# Patient Record
Sex: FEMALE | Race: BLACK OR AFRICAN AMERICAN | NOT HISPANIC OR LATINO | URBAN - METROPOLITAN AREA
[De-identification: names, ages, dates, MRNs, and addresses within clinical notes are randomized per-mention and may not be internally consistent; named-entity substitution may affect disease eponyms.]

---

## 2018-07-19 ENCOUNTER — NEW PATIENT (OUTPATIENT)
Dept: URBAN - METROPOLITAN AREA CLINIC 56 | Facility: CLINIC | Age: 83
End: 2018-07-19
Payer: MEDICARE

## 2018-07-19 DIAGNOSIS — H17.13 CENTRAL CORNEAL OPACITY, BILATERAL: ICD-10-CM

## 2018-07-19 DIAGNOSIS — E11.9 TYPE 2 DIABETES MELLITUS WITHOUT COMPLICATIONS: Primary | ICD-10-CM

## 2018-07-19 DIAGNOSIS — H40.1132 PRIMARY OPEN-ANGLE GLAUCOMA, MODERATE STAGE, BILATERAL: ICD-10-CM

## 2018-07-19 PROCEDURE — 92004 COMPRE OPH EXAM NEW PT 1/>: CPT | Performed by: OPTOMETRIST

## 2018-07-19 PROCEDURE — 92250 FUNDUS PHOTOGRAPHY W/I&R: CPT | Performed by: OPTOMETRIST

## 2018-07-19 RX ORDER — DORZOLAMIDE HCL 20 MG/ML
2 % SOLUTION/ DROPS OPHTHALMIC
Qty: 1 | Refills: 3 | Status: INACTIVE
Start: 2018-07-19 | End: 2018-10-22

## 2018-07-19 RX ORDER — BRIMONIDINE TARTRATE 1 MG/ML
0.1 % SOLUTION/ DROPS OPHTHALMIC
Qty: 1 | Refills: 3 | Status: INACTIVE
Start: 2018-07-19 | End: 2018-10-22

## 2018-07-19 RX ORDER — LATANOPROST 50 UG/ML
0.005 % SOLUTION OPHTHALMIC
Qty: 0 | Refills: 0 | Status: INACTIVE
Start: 2018-07-19 | End: 2018-07-20

## 2018-07-19 ASSESSMENT — VISUAL ACUITY
OD: 20/30
OS: 20/30

## 2018-07-19 ASSESSMENT — INTRAOCULAR PRESSURE
OD: 16
OS: 13

## 2018-07-19 ASSESSMENT — KERATOMETRY: OD: 39.16

## 2018-10-22 ENCOUNTER — CONSULT (OUTPATIENT)
Dept: URBAN - METROPOLITAN AREA CLINIC 56 | Facility: CLINIC | Age: 83
End: 2018-10-22
Payer: MEDICARE

## 2018-10-22 PROCEDURE — 92014 COMPRE OPH EXAM EST PT 1/>: CPT | Performed by: OPHTHALMOLOGY

## 2018-10-22 PROCEDURE — 76514 ECHO EXAM OF EYE THICKNESS: CPT | Performed by: OPHTHALMOLOGY

## 2018-10-22 PROCEDURE — 92020 GONIOSCOPY: CPT | Performed by: OPHTHALMOLOGY

## 2018-10-22 PROCEDURE — 92083 EXTENDED VISUAL FIELD XM: CPT | Performed by: OPHTHALMOLOGY

## 2018-10-22 RX ORDER — BRIMONIDINE TARTRATE 1 MG/ML
0.1 % SOLUTION/ DROPS OPHTHALMIC
Qty: 1 | Refills: 5 | Status: INACTIVE
Start: 2018-10-22 | End: 2018-11-09

## 2018-10-22 RX ORDER — LATANOPROST 50 UG/ML
0.005 % SOLUTION OPHTHALMIC
Qty: 1 | Refills: 5 | Status: INACTIVE
Start: 2018-10-22 | End: 2018-11-09

## 2018-10-22 RX ORDER — DORZOLAMIDE HCL 20 MG/ML
2 % SOLUTION/ DROPS OPHTHALMIC
Qty: 1 | Refills: 5 | Status: INACTIVE
Start: 2018-10-22 | End: 2018-10-31

## 2018-10-22 ASSESSMENT — INTRAOCULAR PRESSURE
OS: 28
OD: 17
OS: 20

## 2018-11-09 ENCOUNTER — FOLLOW UP ESTABLISHED (OUTPATIENT)
Dept: URBAN - METROPOLITAN AREA CLINIC 56 | Facility: CLINIC | Age: 83
End: 2018-11-09
Payer: MEDICARE

## 2018-11-09 PROCEDURE — 92012 INTRM OPH EXAM EST PATIENT: CPT | Performed by: OPTOMETRIST

## 2018-11-09 RX ORDER — BESIFLOXACIN 6 MG/ML
0.6 % SUSPENSION OPHTHALMIC
Qty: 1 | Refills: 0 | Status: INACTIVE
Start: 2018-11-09 | End: 2018-11-10

## 2018-11-10 ENCOUNTER — FOLLOW UP ESTABLISHED (OUTPATIENT)
Dept: URBAN - METROPOLITAN AREA CLINIC 10 | Facility: CLINIC | Age: 83
End: 2018-11-10
Payer: MEDICARE

## 2018-11-10 PROCEDURE — 92012 INTRM OPH EXAM EST PATIENT: CPT | Performed by: OPTOMETRIST

## 2018-11-10 RX ORDER — BESIFLOXACIN 6 MG/ML
0.6 % SUSPENSION OPHTHALMIC
Qty: 1 | Refills: 2 | Status: INACTIVE
Start: 2018-11-10 | End: 2018-11-12

## 2018-11-12 ENCOUNTER — CONSULT (OUTPATIENT)
Dept: URBAN - METROPOLITAN AREA CLINIC 10 | Facility: CLINIC | Age: 83
End: 2018-11-12
Payer: MEDICARE

## 2018-11-12 PROCEDURE — 92014 COMPRE OPH EXAM EST PT 1/>: CPT | Performed by: OPHTHALMOLOGY

## 2018-11-12 PROCEDURE — 92004 COMPRE OPH EXAM NEW PT 1/>: CPT | Performed by: OPHTHALMOLOGY

## 2018-11-12 RX ORDER — BESIFLOXACIN 6 MG/ML
0.6 % SUSPENSION OPHTHALMIC
Qty: 1 | Refills: 2 | Status: INACTIVE
Start: 2018-11-12 | End: 2018-11-19

## 2018-11-12 RX ORDER — PREDNISOLONE ACETATE 10 MG/ML
1 % SUSPENSION/ DROPS OPHTHALMIC
Qty: 1 | Refills: 1 | Status: INACTIVE
Start: 2018-11-12 | End: 2018-11-19

## 2018-11-12 RX ORDER — CYCLOPENTOLATE HYDROCHLORIDE 10 MG/ML
1 % SOLUTION/ DROPS OPHTHALMIC
Qty: 1 | Refills: 1 | Status: INACTIVE
Start: 2018-11-12 | End: 2018-11-19

## 2018-11-14 ENCOUNTER — FOLLOW UP ESTABLISHED (OUTPATIENT)
Dept: URBAN - METROPOLITAN AREA CLINIC 44 | Facility: CLINIC | Age: 83
End: 2018-11-14
Payer: MEDICARE

## 2018-11-14 DIAGNOSIS — H18.422 BAND KERATOPATHY, LEFT EYE: ICD-10-CM

## 2018-11-14 PROCEDURE — 92014 COMPRE OPH EXAM EST PT 1/>: CPT | Performed by: OPHTHALMOLOGY

## 2018-11-16 ENCOUNTER — FOLLOW UP ESTABLISHED (OUTPATIENT)
Dept: URBAN - METROPOLITAN AREA CLINIC 56 | Facility: CLINIC | Age: 83
End: 2018-11-16
Payer: MEDICARE

## 2018-11-16 PROCEDURE — 92012 INTRM OPH EXAM EST PATIENT: CPT | Performed by: OPTOMETRIST

## 2018-11-19 ENCOUNTER — FOLLOW UP ESTABLISHED (OUTPATIENT)
Dept: URBAN - METROPOLITAN AREA CLINIC 44 | Facility: CLINIC | Age: 83
End: 2018-11-19
Payer: MEDICARE

## 2018-11-19 PROCEDURE — 92012 INTRM OPH EXAM EST PATIENT: CPT | Performed by: OPHTHALMOLOGY

## 2018-11-19 RX ORDER — LATANOPROST 50 UG/ML
0.005 % SOLUTION OPHTHALMIC
Qty: 0 | Refills: 0 | Status: INACTIVE
Start: 2018-11-19 | End: 2019-03-22

## 2018-11-19 RX ORDER — PREDNISOLONE/GATIFLOX/NEPAFEN 1-0.5-0.1%
1 % SUSPENSION, DROPS(FINAL DOSAGE FORM)(ML) OPHTHALMIC (EYE)
Qty: 0 | Refills: 0 | Status: INACTIVE
Start: 2018-11-19 | End: 2018-11-21

## 2018-11-19 RX ORDER — CYCLOPENTOLATE HYDROCHLORIDE 10 MG/ML
1 % SOLUTION/ DROPS OPHTHALMIC
Qty: 0 | Refills: 0 | Status: INACTIVE
Start: 2018-11-19 | End: 2018-11-21

## 2018-11-19 RX ORDER — DORZOLAMIDE/TIMOLOL/PF 2 %-0.5 %
DROPS OPHTHALMIC (EYE)
Qty: 0 | Refills: 0 | Status: INACTIVE
Start: 2018-11-19 | End: 2019-02-21

## 2018-11-21 ENCOUNTER — FOLLOW UP ESTABLISHED (OUTPATIENT)
Dept: URBAN - METROPOLITAN AREA CLINIC 56 | Facility: CLINIC | Age: 83
End: 2018-11-21
Payer: MEDICARE

## 2018-11-21 DIAGNOSIS — H16.032 CORNEAL ULCER WITH HYPOPYON, LEFT EYE: Primary | ICD-10-CM

## 2018-11-21 PROCEDURE — 92012 INTRM OPH EXAM EST PATIENT: CPT | Performed by: OPTOMETRIST

## 2018-11-26 ENCOUNTER — FOLLOW UP ESTABLISHED (OUTPATIENT)
Dept: URBAN - METROPOLITAN AREA CLINIC 56 | Facility: CLINIC | Age: 83
End: 2018-11-26
Payer: MEDICARE

## 2018-11-26 PROCEDURE — 92012 INTRM OPH EXAM EST PATIENT: CPT | Performed by: OPTOMETRIST

## 2018-11-28 ENCOUNTER — FOLLOW UP ESTABLISHED (OUTPATIENT)
Dept: URBAN - METROPOLITAN AREA CLINIC 44 | Facility: CLINIC | Age: 83
End: 2018-11-28
Payer: MEDICARE

## 2018-11-28 PROCEDURE — 92012 INTRM OPH EXAM EST PATIENT: CPT | Performed by: OPHTHALMOLOGY

## 2018-11-28 PROCEDURE — 65430 CORNEAL SMEAR: CPT | Performed by: OPHTHALMOLOGY

## 2018-11-30 ENCOUNTER — FOLLOW UP ESTABLISHED (OUTPATIENT)
Dept: URBAN - METROPOLITAN AREA CLINIC 44 | Facility: CLINIC | Age: 83
End: 2018-11-30
Payer: MEDICARE

## 2018-11-30 PROCEDURE — 92012 INTRM OPH EXAM EST PATIENT: CPT | Performed by: OPTOMETRIST

## 2018-12-03 ENCOUNTER — FOLLOW UP ESTABLISHED (OUTPATIENT)
Dept: URBAN - METROPOLITAN AREA CLINIC 44 | Facility: CLINIC | Age: 83
End: 2018-12-03
Payer: MEDICARE

## 2018-12-03 PROCEDURE — 92012 INTRM OPH EXAM EST PATIENT: CPT | Performed by: OPHTHALMOLOGY

## 2018-12-03 RX ORDER — NEOMYCIN SULFATE, POLYMYXIN B SULFATE AND DEXAMETHASONE 3.5; 10000; 1 MG/G; [USP'U]/G; MG/G
OINTMENT OPHTHALMIC
Qty: 1 | Refills: 1 | Status: INACTIVE
Start: 2018-12-03 | End: 2018-12-26

## 2018-12-03 RX ORDER — PREDNISOLONE/GATIFLOX/NEPAFEN 1-0.5-0.1%
1 % SUSPENSION, DROPS(FINAL DOSAGE FORM)(ML) OPHTHALMIC (EYE)
Qty: 1 | Refills: 2 | Status: INACTIVE
Start: 2018-12-03 | End: 2018-12-26

## 2018-12-07 ENCOUNTER — FOLLOW UP ESTABLISHED (OUTPATIENT)
Dept: URBAN - METROPOLITAN AREA CLINIC 56 | Facility: CLINIC | Age: 83
End: 2018-12-07
Payer: MEDICARE

## 2018-12-07 PROCEDURE — 92012 INTRM OPH EXAM EST PATIENT: CPT | Performed by: OPTOMETRIST

## 2018-12-12 ENCOUNTER — FOLLOW UP ESTABLISHED (OUTPATIENT)
Dept: URBAN - METROPOLITAN AREA CLINIC 44 | Facility: CLINIC | Age: 83
End: 2018-12-12
Payer: MEDICARE

## 2018-12-12 PROCEDURE — 92012 INTRM OPH EXAM EST PATIENT: CPT | Performed by: OPHTHALMOLOGY

## 2018-12-17 ENCOUNTER — FOLLOW UP ESTABLISHED (OUTPATIENT)
Dept: URBAN - METROPOLITAN AREA CLINIC 44 | Facility: CLINIC | Age: 83
End: 2018-12-17
Payer: MEDICARE

## 2018-12-17 DIAGNOSIS — H18.423 BAND KERATOPATHY, BILATERAL: ICD-10-CM

## 2018-12-17 PROCEDURE — 92071 CONTACT LENS FITTING FOR TX: CPT | Performed by: OPHTHALMOLOGY

## 2018-12-17 PROCEDURE — 92012 INTRM OPH EXAM EST PATIENT: CPT | Performed by: OPHTHALMOLOGY

## 2018-12-17 ASSESSMENT — INTRAOCULAR PRESSURE
OD: 14
OS: 18

## 2018-12-26 ENCOUNTER — FOLLOW UP ESTABLISHED (OUTPATIENT)
Dept: URBAN - METROPOLITAN AREA CLINIC 44 | Facility: CLINIC | Age: 83
End: 2018-12-26
Payer: MEDICARE

## 2018-12-26 PROCEDURE — 92012 INTRM OPH EXAM EST PATIENT: CPT | Performed by: OPHTHALMOLOGY

## 2018-12-26 RX ORDER — ERYTHROMYCIN 5 MG/G
OINTMENT OPHTHALMIC
Qty: 1 | Refills: 1 | Status: INACTIVE
Start: 2018-12-26 | End: 2019-01-08

## 2018-12-26 ASSESSMENT — INTRAOCULAR PRESSURE
OD: 12
OS: 12

## 2018-12-31 ENCOUNTER — FOLLOW UP ESTABLISHED (OUTPATIENT)
Dept: URBAN - METROPOLITAN AREA CLINIC 56 | Facility: CLINIC | Age: 83
End: 2018-12-31
Payer: MEDICARE

## 2018-12-31 PROCEDURE — 92012 INTRM OPH EXAM EST PATIENT: CPT | Performed by: OPTOMETRIST

## 2018-12-31 RX ORDER — PREDNISOLONE ACETATE 10 MG/ML
1 % SUSPENSION/ DROPS OPHTHALMIC
Qty: 1 | Refills: 1 | Status: INACTIVE
Start: 2018-12-31 | End: 2019-01-23

## 2018-12-31 ASSESSMENT — INTRAOCULAR PRESSURE
OD: 14
OS: 7

## 2019-01-04 ENCOUNTER — FOLLOW UP ESTABLISHED (OUTPATIENT)
Dept: URBAN - METROPOLITAN AREA CLINIC 56 | Facility: CLINIC | Age: 84
End: 2019-01-04
Payer: MEDICARE

## 2019-01-04 PROCEDURE — 92012 INTRM OPH EXAM EST PATIENT: CPT | Performed by: OPTOMETRIST

## 2019-01-14 ENCOUNTER — FOLLOW UP ESTABLISHED (OUTPATIENT)
Dept: URBAN - METROPOLITAN AREA CLINIC 44 | Facility: CLINIC | Age: 84
End: 2019-01-14
Payer: MEDICARE

## 2019-01-14 PROCEDURE — 92012 INTRM OPH EXAM EST PATIENT: CPT | Performed by: OPHTHALMOLOGY

## 2019-01-14 ASSESSMENT — INTRAOCULAR PRESSURE
OS: 25
OD: 13
OD: 12
OS: 23

## 2019-01-21 ENCOUNTER — FOLLOW UP ESTABLISHED (OUTPATIENT)
Dept: URBAN - METROPOLITAN AREA CLINIC 44 | Facility: CLINIC | Age: 84
End: 2019-01-21
Payer: MEDICARE

## 2019-01-21 DIAGNOSIS — Z79.84 LONG TERM (CURRENT) USE OF ORAL ANTIDIABETIC DRUGS: ICD-10-CM

## 2019-01-21 DIAGNOSIS — H02.839 DERMATOCHALASIS OF UNSPECIFIED EYE, UNSPECIFIED EYELID: ICD-10-CM

## 2019-01-21 PROCEDURE — 92012 INTRM OPH EXAM EST PATIENT: CPT | Performed by: OPHTHALMOLOGY

## 2019-01-21 ASSESSMENT — INTRAOCULAR PRESSURE
OD: 18
OS: 20

## 2019-01-23 ENCOUNTER — FOLLOW UP ESTABLISHED (OUTPATIENT)
Dept: URBAN - METROPOLITAN AREA CLINIC 44 | Facility: CLINIC | Age: 84
End: 2019-01-23
Payer: MEDICARE

## 2019-01-23 PROCEDURE — 92012 INTRM OPH EXAM EST PATIENT: CPT | Performed by: OPHTHALMOLOGY

## 2019-01-23 RX ORDER — OFLOXACIN 3 MG/ML
0.3 % SOLUTION/ DROPS OPHTHALMIC
Qty: 1 | Refills: 1 | Status: INACTIVE
Start: 2019-01-23 | End: 2019-04-10

## 2019-01-23 RX ORDER — PREDNISOLONE ACETATE 10 MG/ML
1 % SUSPENSION/ DROPS OPHTHALMIC
Qty: 1 | Refills: 1 | Status: INACTIVE
Start: 2019-01-23 | End: 2019-05-01

## 2019-01-23 ASSESSMENT — INTRAOCULAR PRESSURE
OD: 12
OS: 26

## 2019-01-28 ENCOUNTER — FOLLOW UP ESTABLISHED (OUTPATIENT)
Dept: URBAN - METROPOLITAN AREA CLINIC 44 | Facility: CLINIC | Age: 84
End: 2019-01-28
Payer: MEDICARE

## 2019-01-28 PROCEDURE — 92012 INTRM OPH EXAM EST PATIENT: CPT | Performed by: OPHTHALMOLOGY

## 2019-01-28 ASSESSMENT — INTRAOCULAR PRESSURE
OD: 15
OS: 53

## 2019-02-11 ENCOUNTER — FOLLOW UP ESTABLISHED (OUTPATIENT)
Dept: URBAN - METROPOLITAN AREA CLINIC 44 | Facility: CLINIC | Age: 84
End: 2019-02-11
Payer: MEDICARE

## 2019-02-11 PROCEDURE — 92012 INTRM OPH EXAM EST PATIENT: CPT | Performed by: OPHTHALMOLOGY

## 2019-02-11 ASSESSMENT — INTRAOCULAR PRESSURE
OD: 18
OS: 26

## 2019-05-01 ENCOUNTER — FOLLOW UP ESTABLISHED (OUTPATIENT)
Dept: URBAN - METROPOLITAN AREA CLINIC 44 | Facility: CLINIC | Age: 84
End: 2019-05-01
Payer: MEDICARE

## 2019-05-01 PROCEDURE — 65436 CURETTE/TREAT CORNEA: CPT | Performed by: OPHTHALMOLOGY

## 2019-05-01 PROCEDURE — 92012 INTRM OPH EXAM EST PATIENT: CPT | Performed by: OPHTHALMOLOGY

## 2019-05-01 RX ORDER — OFLOXACIN 3 MG/ML
0.3 % SOLUTION/ DROPS OPHTHALMIC
Qty: 1 | Refills: 1 | Status: INACTIVE
Start: 2019-05-01 | End: 2019-07-09

## 2019-05-01 RX ORDER — PREDNISOLONE ACETATE 10 MG/ML
1 % SUSPENSION/ DROPS OPHTHALMIC
Qty: 1 | Refills: 1 | Status: INACTIVE
Start: 2019-05-01 | End: 2019-07-09

## 2019-05-08 ENCOUNTER — FOLLOW UP ESTABLISHED (OUTPATIENT)
Dept: URBAN - METROPOLITAN AREA CLINIC 56 | Facility: CLINIC | Age: 84
End: 2019-05-08
Payer: MEDICARE

## 2019-05-08 PROCEDURE — 92012 INTRM OPH EXAM EST PATIENT: CPT | Performed by: OPTOMETRIST

## 2019-05-08 ASSESSMENT — INTRAOCULAR PRESSURE: OS: 23

## 2019-05-20 ENCOUNTER — POST OP (OUTPATIENT)
Dept: URBAN - METROPOLITAN AREA CLINIC 44 | Facility: CLINIC | Age: 84
End: 2019-05-20
Payer: MEDICARE

## 2019-05-20 PROCEDURE — 92012 INTRM OPH EXAM EST PATIENT: CPT | Performed by: OPHTHALMOLOGY

## 2019-05-31 ENCOUNTER — FOLLOW UP ESTABLISHED (OUTPATIENT)
Dept: URBAN - METROPOLITAN AREA CLINIC 44 | Facility: CLINIC | Age: 84
End: 2019-05-31
Payer: MEDICARE

## 2019-05-31 PROCEDURE — 92012 INTRM OPH EXAM EST PATIENT: CPT | Performed by: OPHTHALMOLOGY

## 2019-05-31 ASSESSMENT — INTRAOCULAR PRESSURE
OS: 18
OD: 14

## 2019-07-09 ENCOUNTER — FOLLOW UP ESTABLISHED (OUTPATIENT)
Dept: URBAN - METROPOLITAN AREA CLINIC 44 | Facility: CLINIC | Age: 84
End: 2019-07-09
Payer: MEDICARE

## 2019-07-09 PROCEDURE — 92012 INTRM OPH EXAM EST PATIENT: CPT | Performed by: OPHTHALMOLOGY

## 2019-07-09 RX ORDER — PREDNISOLONE ACETATE 10 MG/ML
1 % SUSPENSION/ DROPS OPHTHALMIC
Qty: 1 | Refills: 1 | Status: INACTIVE
Start: 2019-07-09 | End: 2019-08-21

## 2019-07-09 RX ORDER — OFLOXACIN 3 MG/ML
0.3 % SOLUTION/ DROPS OPHTHALMIC
Qty: 1 | Refills: 1 | Status: INACTIVE
Start: 2019-07-09 | End: 2020-01-22

## 2019-07-09 ASSESSMENT — INTRAOCULAR PRESSURE
OS: 20
OD: 17

## 2019-08-21 ENCOUNTER — FOLLOW UP ESTABLISHED (OUTPATIENT)
Dept: URBAN - METROPOLITAN AREA CLINIC 44 | Facility: CLINIC | Age: 84
End: 2019-08-21
Payer: MEDICARE

## 2019-08-21 PROCEDURE — 92012 INTRM OPH EXAM EST PATIENT: CPT | Performed by: OPHTHALMOLOGY

## 2019-08-21 PROCEDURE — 65436 CURETTE/TREAT CORNEA: CPT | Performed by: OPHTHALMOLOGY

## 2019-08-21 RX ORDER — PREDNISOLONE ACETATE 10 MG/ML
1 % SUSPENSION/ DROPS OPHTHALMIC
Qty: 1 | Refills: 1 | Status: INACTIVE
Start: 2019-08-21 | End: 2020-01-22

## 2019-08-30 ENCOUNTER — FOLLOW UP ESTABLISHED (OUTPATIENT)
Dept: URBAN - METROPOLITAN AREA CLINIC 56 | Facility: CLINIC | Age: 84
End: 2019-08-30
Payer: MEDICARE

## 2019-08-30 PROCEDURE — 92012 INTRM OPH EXAM EST PATIENT: CPT | Performed by: OPTOMETRIST

## 2019-08-30 ASSESSMENT — INTRAOCULAR PRESSURE
OS: 22
OD: 22

## 2019-09-04 ENCOUNTER — FOLLOW UP ESTABLISHED (OUTPATIENT)
Dept: URBAN - METROPOLITAN AREA CLINIC 44 | Facility: CLINIC | Age: 84
End: 2019-09-04
Payer: MEDICARE

## 2019-09-04 PROCEDURE — 99024 POSTOP FOLLOW-UP VISIT: CPT | Performed by: OPHTHALMOLOGY

## 2019-09-04 PROCEDURE — 92012 INTRM OPH EXAM EST PATIENT: CPT | Performed by: OPHTHALMOLOGY

## 2019-09-04 ASSESSMENT — VISUAL ACUITY
OS: 20/60
OD: 20/40

## 2019-09-04 ASSESSMENT — INTRAOCULAR PRESSURE
OD: 17
OS: 15

## 2019-11-18 ENCOUNTER — FOLLOW UP ESTABLISHED (OUTPATIENT)
Dept: URBAN - METROPOLITAN AREA CLINIC 44 | Facility: CLINIC | Age: 84
End: 2019-11-18
Payer: MEDICARE

## 2019-11-18 PROCEDURE — 92012 INTRM OPH EXAM EST PATIENT: CPT | Performed by: OPHTHALMOLOGY

## 2019-11-18 PROCEDURE — 92083 EXTENDED VISUAL FIELD XM: CPT | Performed by: OPHTHALMOLOGY

## 2019-11-18 RX ORDER — NETARSUDIL 0.2 MG/ML
0.02 % SOLUTION/ DROPS OPHTHALMIC; TOPICAL
Qty: 3 | Refills: 1 | Status: INACTIVE
Start: 2019-11-18 | End: 2020-08-21

## 2019-11-18 ASSESSMENT — INTRAOCULAR PRESSURE
OS: 16
OD: 14

## 2019-12-11 ENCOUNTER — FOLLOW UP ESTABLISHED (OUTPATIENT)
Dept: URBAN - METROPOLITAN AREA CLINIC 44 | Facility: CLINIC | Age: 84
End: 2019-12-11
Payer: MEDICARE

## 2019-12-11 PROCEDURE — 92012 INTRM OPH EXAM EST PATIENT: CPT | Performed by: OPHTHALMOLOGY

## 2019-12-11 PROCEDURE — 92025 CPTRIZED CORNEAL TOPOGRAPHY: CPT | Performed by: OPHTHALMOLOGY

## 2019-12-11 ASSESSMENT — INTRAOCULAR PRESSURE
OS: 16
OD: 16

## 2019-12-11 ASSESSMENT — VISUAL ACUITY
OS: 20/150
OD: 20/20

## 2020-01-22 ENCOUNTER — FOLLOW UP ESTABLISHED (OUTPATIENT)
Dept: URBAN - METROPOLITAN AREA CLINIC 44 | Facility: CLINIC | Age: 85
End: 2020-01-22
Payer: MEDICARE

## 2020-01-22 PROCEDURE — 92012 INTRM OPH EXAM EST PATIENT: CPT | Performed by: OPHTHALMOLOGY

## 2020-01-22 RX ORDER — PREDNISOLONE ACETATE 10 MG/ML
1 % SUSPENSION/ DROPS OPHTHALMIC
Qty: 10 | Refills: 3 | Status: INACTIVE
Start: 2020-01-22 | End: 2021-01-04

## 2020-01-22 ASSESSMENT — INTRAOCULAR PRESSURE
OD: 23
OS: 23

## 2020-02-10 ENCOUNTER — FOLLOW UP ESTABLISHED (OUTPATIENT)
Dept: URBAN - METROPOLITAN AREA CLINIC 44 | Facility: CLINIC | Age: 85
End: 2020-02-10
Payer: MEDICARE

## 2020-02-10 PROCEDURE — 92012 INTRM OPH EXAM EST PATIENT: CPT | Performed by: OPHTHALMOLOGY

## 2020-02-10 ASSESSMENT — INTRAOCULAR PRESSURE
OS: 16
OD: 14

## 2020-06-01 ENCOUNTER — FOLLOW UP ESTABLISHED (OUTPATIENT)
Dept: URBAN - METROPOLITAN AREA CLINIC 44 | Facility: CLINIC | Age: 85
End: 2020-06-01
Payer: MEDICARE

## 2020-06-01 PROCEDURE — 92012 INTRM OPH EXAM EST PATIENT: CPT | Performed by: OPHTHALMOLOGY

## 2020-06-01 ASSESSMENT — INTRAOCULAR PRESSURE
OD: 21
OS: 22

## 2020-06-29 ENCOUNTER — FOLLOW UP ESTABLISHED (OUTPATIENT)
Dept: URBAN - METROPOLITAN AREA CLINIC 44 | Facility: CLINIC | Age: 85
End: 2020-06-29
Payer: MEDICARE

## 2020-06-29 PROCEDURE — 92012 INTRM OPH EXAM EST PATIENT: CPT | Performed by: OPHTHALMOLOGY

## 2020-06-29 RX ORDER — DORZOLAMIDE HCL 20 MG/ML
2 % SOLUTION/ DROPS OPHTHALMIC
Qty: 10 | Refills: 5 | Status: INACTIVE
Start: 2020-06-29 | End: 2021-04-05

## 2020-06-29 RX ORDER — BRIMONIDINE TARTRATE 1 MG/ML
0.1 % SOLUTION/ DROPS OPHTHALMIC
Qty: 15 | Refills: 2 | Status: INACTIVE
Start: 2020-06-29 | End: 2021-02-01

## 2020-06-29 ASSESSMENT — INTRAOCULAR PRESSURE
OD: 13
OS: 15

## 2020-09-02 ENCOUNTER — FOLLOW UP ESTABLISHED (OUTPATIENT)
Dept: URBAN - METROPOLITAN AREA CLINIC 44 | Facility: CLINIC | Age: 85
End: 2020-09-02
Payer: MEDICARE

## 2020-09-02 PROCEDURE — 65436 CURETTE/TREAT CORNEA: CPT | Performed by: OPHTHALMOLOGY

## 2020-09-02 PROCEDURE — 92012 INTRM OPH EXAM EST PATIENT: CPT | Performed by: OPHTHALMOLOGY

## 2020-09-02 RX ORDER — ACETAMINOPHEN AND CODEINE PHOSPHATE 300; 30 MG/1; MG/1
TABLET ORAL
Qty: 10 | Refills: 0 | Status: ACTIVE
Start: 2020-09-02

## 2020-09-09 ENCOUNTER — FOLLOW UP ESTABLISHED (OUTPATIENT)
Dept: URBAN - METROPOLITAN AREA CLINIC 44 | Facility: CLINIC | Age: 85
End: 2020-09-09
Payer: MEDICARE

## 2020-09-09 PROCEDURE — 92012 INTRM OPH EXAM EST PATIENT: CPT | Performed by: OPTOMETRIST

## 2020-09-30 ENCOUNTER — FOLLOW UP ESTABLISHED (OUTPATIENT)
Dept: URBAN - METROPOLITAN AREA CLINIC 44 | Facility: CLINIC | Age: 85
End: 2020-09-30
Payer: MEDICARE

## 2020-09-30 PROCEDURE — 92025 CPTRIZED CORNEAL TOPOGRAPHY: CPT | Performed by: OPHTHALMOLOGY

## 2020-09-30 PROCEDURE — 99024 POSTOP FOLLOW-UP VISIT: CPT | Performed by: OPHTHALMOLOGY

## 2020-09-30 PROCEDURE — 92012 INTRM OPH EXAM EST PATIENT: CPT | Performed by: OPHTHALMOLOGY

## 2020-09-30 ASSESSMENT — INTRAOCULAR PRESSURE
OD: 16
OS: 16

## 2020-09-30 ASSESSMENT — VISUAL ACUITY: OS: 20/200

## 2020-11-30 ENCOUNTER — FOLLOW UP ESTABLISHED (OUTPATIENT)
Dept: URBAN - METROPOLITAN AREA CLINIC 44 | Facility: CLINIC | Age: 85
End: 2020-11-30
Payer: MEDICARE

## 2020-11-30 DIAGNOSIS — H40.1133 PRIMARY OPEN-ANGLE GLAUCOMA, BILATERAL, SEVERE STAGE: ICD-10-CM

## 2020-11-30 PROCEDURE — 92012 INTRM OPH EXAM EST PATIENT: CPT | Performed by: OPHTHALMOLOGY

## 2020-11-30 ASSESSMENT — INTRAOCULAR PRESSURE
OD: 17
OS: 18

## 2020-12-14 ENCOUNTER — FOLLOW UP ESTABLISHED (OUTPATIENT)
Dept: URBAN - METROPOLITAN AREA CLINIC 44 | Facility: CLINIC | Age: 85
End: 2020-12-14
Payer: MEDICARE

## 2020-12-14 PROCEDURE — 92012 INTRM OPH EXAM EST PATIENT: CPT | Performed by: OPHTHALMOLOGY

## 2020-12-14 PROCEDURE — 92083 EXTENDED VISUAL FIELD XM: CPT | Performed by: OPHTHALMOLOGY

## 2020-12-14 ASSESSMENT — INTRAOCULAR PRESSURE
OS: 14
OD: 13

## 2021-04-05 ENCOUNTER — OFFICE VISIT (OUTPATIENT)
Dept: URBAN - METROPOLITAN AREA CLINIC 44 | Facility: CLINIC | Age: 86
End: 2021-04-05
Payer: MEDICARE

## 2021-04-05 PROCEDURE — 99213 OFFICE O/P EST LOW 20 MIN: CPT | Performed by: OPHTHALMOLOGY

## 2021-04-05 RX ORDER — DORZOLAMIDE HCL 20 MG/ML
2 % SOLUTION/ DROPS OPHTHALMIC
Qty: 10 | Refills: 2 | Status: INACTIVE
Start: 2021-04-05 | End: 2022-03-25

## 2021-04-05 RX ORDER — BRIMONIDINE TARTRATE 1 MG/ML
0.1 % SOLUTION/ DROPS OPHTHALMIC
Qty: 15 | Refills: 2 | Status: INACTIVE
Start: 2021-04-05 | End: 2021-11-29

## 2021-04-05 RX ORDER — PREDNISOLONE ACETATE 10 MG/ML
1 % SUSPENSION/ DROPS OPHTHALMIC
Qty: 5 | Refills: 1 | Status: ACTIVE
Start: 2021-04-05

## 2021-04-05 RX ORDER — NETARSUDIL 0.2 MG/ML
0.02 % SOLUTION/ DROPS OPHTHALMIC; TOPICAL
Qty: 7.5 | Refills: 1 | Status: INACTIVE
Start: 2021-04-05 | End: 2021-06-14

## 2021-04-05 ASSESSMENT — INTRAOCULAR PRESSURE
OD: 13
OS: 14

## 2021-04-05 NOTE — IMPRESSION/PLAN
Impression: Primary open-angle glaucoma, severe stage, bilateral
Positive:  Fam Hx of Glaucoma- sister; Heart problems - open heart surgery - heart attack Denies: Sulfa Allergy -  Lung Problems - Sleep Apnea - Hx of Migraines Plan: PLAN: On Alphagan TID OU, Dorzolamide BID OU , Pred QD OS , On Rhopressa QHS OU , OFF Latanoprost OU QHS (hx of iritis) ;  IOP is doing well ou,  but given inflammation history - will remain off latanoprost  , cont  meds the same and return to clinic in 2-3 months for IOP check/VFT , also seen by Dr Arjun Schuster - will return if needed ; ((TARGET ~< 14 OU for now )) TESTS:  Reviewed 12/14/20  VF OD) sup scatter. VF OS) dense general depression, poor va. Discussed glaucoma diagnosis in detail with patient. Emphasized and explained compliance.  Poor compliance can lead to blindness, given glaucoma booklet 5/31/19

## 2021-04-05 NOTE — IMPRESSION/PLAN
Impression: Recurrent acute iridocyclitis, left eye
- 2/2 syphillis, was hospitalized and treated Plan: Followed by Dr. Shauna Macias. on pred qd os,  per cornea

## 2021-04-05 NOTE — IMPRESSION/PLAN
Impression: Dense Band keratopathy, bilateral
- s/p EDTA chelation OU Plan: s/p edta ou ; Followed by Dr. Laureen Dave.

## 2021-06-14 ENCOUNTER — OFFICE VISIT (OUTPATIENT)
Dept: URBAN - METROPOLITAN AREA CLINIC 44 | Facility: CLINIC | Age: 86
End: 2021-06-14
Payer: MEDICARE

## 2021-06-14 DIAGNOSIS — H17.12 CENTRAL CORNEAL OPACITY, LEFT EYE: Primary | ICD-10-CM

## 2021-06-14 DIAGNOSIS — H20.022 RECURRENT ACUTE IRIDOCYCLITIS, LEFT EYE: ICD-10-CM

## 2021-06-14 PROCEDURE — 99213 OFFICE O/P EST LOW 20 MIN: CPT | Performed by: OPHTHALMOLOGY

## 2021-06-14 RX ORDER — NETARSUDIL 0.2 MG/ML
0.02 % SOLUTION/ DROPS OPHTHALMIC; TOPICAL
Qty: 7.5 | Refills: 2 | Status: ACTIVE
Start: 2021-06-14

## 2021-06-14 ASSESSMENT — INTRAOCULAR PRESSURE
OD: 14
OS: 14

## 2021-06-14 NOTE — IMPRESSION/PLAN
Impression: Central corneal opacity of left eye Plan: Stable, no surgical intervention recommend, vision limited by glaucoma. 
d/c pred

## 2021-06-14 NOTE — IMPRESSION/PLAN
Impression: Primary open-angle glaucoma, severe stage, bilateral
Positive:  Fam Hx of Glaucoma- sister; Heart problems - open heart surgery - heart attack  Denies: Sulfa Allergy -  Lung Problems - Sleep Apnea - Hx of Migraines Plan: continue all glaucoma drops as directed by Dr. Cody Velasquez

## 2021-06-14 NOTE — IMPRESSION/PLAN
Impression: Recurrent acute iridocyclitis, left eye
- 2/2 syphillis, was hospitalized and treated Plan: Has been quiet for several months, may d/c pred

## 2021-06-14 NOTE — IMPRESSION/PLAN
Impression: Central corneal opacity of left eye Plan: Stable, limits vision. Explained to pt only options for visual recovery are specialty CTLs vs PKP. Will monitor for now.

## 2021-07-12 ENCOUNTER — OFFICE VISIT (OUTPATIENT)
Dept: URBAN - METROPOLITAN AREA CLINIC 44 | Facility: CLINIC | Age: 86
End: 2021-07-12
Payer: MEDICARE

## 2021-07-12 PROCEDURE — 99214 OFFICE O/P EST MOD 30 MIN: CPT | Performed by: OPHTHALMOLOGY

## 2021-07-12 PROCEDURE — 92083 EXTENDED VISUAL FIELD XM: CPT | Performed by: OPHTHALMOLOGY

## 2021-07-12 ASSESSMENT — INTRAOCULAR PRESSURE
OS: 13
OD: 13

## 2021-07-12 NOTE — IMPRESSION/PLAN
Impression: Recurrent acute iridocyclitis, left eye
- 2/2 syphillis, was hospitalized and treated Plan: off pred, continue to monitor. , cont to follow in general clinic

## 2021-07-12 NOTE — IMPRESSION/PLAN
Impression: Central corneal opacity of left eye Plan: no surgical intervention recommend by Dr. Shayy Dill. recommend patient establish baseline eval with general clinic.

## 2021-07-12 NOTE — IMPRESSION/PLAN
Impression: Primary open-angle glaucoma, severe stage, bilateral
Positive:  Fam Hx of Glaucoma- sister; Heart problems - open heart surgery - heart attack Denies: Sulfa Allergy -  Lung Problems - Sleep Apnea - Hx of Migraines Plan: PLAN: On Alphagan TID OU, Dorzolamide BID OU, On Rhopressa QHS OU, OFF Pred QD OS, OFF Latanoprost OU QHS (hx of iritis); VF is similar os, fluctuates od but IOP is holding ou,  but given inflammation history - will remain off latanoprost  , cont  meds the same and return to clinic in 2-3 months for IOP check/ OCT , also seen by Dr Anna Marie Biswas - will return if needed ; ((TARGET ~< 14 OU for now )) TESTS:  Reviewed 07/12/21 Visual Field - OD: Poor-sup depression , inferior scatter; OS: Poor-dense general depression, poor va
12/14/20  VF OD) sup scatter. VF OS) dense general depression, poor va. Discussed glaucoma diagnosis in detail with patient. Emphasized and explained compliance.  Poor compliance can lead to blindness, given glaucoma booklet 5/31/19

## 2021-07-29 ENCOUNTER — OFFICE VISIT (OUTPATIENT)
Dept: URBAN - METROPOLITAN AREA CLINIC 44 | Facility: CLINIC | Age: 86
End: 2021-07-29
Payer: MEDICARE

## 2021-07-29 DIAGNOSIS — H52.4 PRESBYOPIA: ICD-10-CM

## 2021-07-29 PROCEDURE — 92134 CPTRZ OPH DX IMG PST SGM RTA: CPT | Performed by: OPTOMETRIST

## 2021-07-29 PROCEDURE — 99213 OFFICE O/P EST LOW 20 MIN: CPT | Performed by: OPTOMETRIST

## 2021-07-29 ASSESSMENT — KERATOMETRY
OS: 36.75
OD: 39.63

## 2021-07-29 ASSESSMENT — INTRAOCULAR PRESSURE
OS: 16
OD: 10

## 2021-07-29 ASSESSMENT — VISUAL ACUITY
OS: HM
OD: 20/30

## 2021-07-29 NOTE — IMPRESSION/PLAN
Impression: Recurrent acute iridocyclitis, left eye Plan: History of iritis secondary to syphilis, was hospitalized and treated. Both eyes are quiet today. RTC if develop recurrent symptoms.

## 2021-07-29 NOTE — IMPRESSION/PLAN
Impression: Primary open-angle glaucoma, severe stage, bilateral
 Plan: Continue current meds. Follow up with Dr. Bubba Rich as scheduled.

## 2021-07-29 NOTE — IMPRESSION/PLAN
Impression: Central corneal opacity of left eye Plan: Stable appearance. Surgery not recommend by Dr. Florence Martínez. Monitor.

## 2022-02-07 ENCOUNTER — OFFICE VISIT (OUTPATIENT)
Dept: URBAN - METROPOLITAN AREA CLINIC 44 | Facility: CLINIC | Age: 87
End: 2022-02-07
Payer: MEDICARE

## 2022-02-07 PROCEDURE — 92133 CPTRZD OPH DX IMG PST SGM ON: CPT | Performed by: OPHTHALMOLOGY

## 2022-02-07 PROCEDURE — 99214 OFFICE O/P EST MOD 30 MIN: CPT | Performed by: OPHTHALMOLOGY

## 2022-02-07 ASSESSMENT — INTRAOCULAR PRESSURE
OD: 13
OS: 13

## 2022-02-07 NOTE — IMPRESSION/PLAN
Impression: Primary open-angle glaucoma, severe stage, bilateral
Positive:  Fam Hx of Glaucoma- sister; Heart problems - open heart surgery - heart attack Denies: Sulfa Allergy -  Lung Problems - Sleep Apnea - Hx of Migraines . .. no fdts  Plan: PLAN: On Alphagan TID OU, Dorzolamide BID OU, On Rhopressa QHS OU, OFF Pred QD OS, OFF Latanoprost OU QHS (hx of iritis); OCT  fluctuates- improved baseline quality today, but IOP is stable ou,  but given inflammation history - will remain off latanoprost  , cont  meds the same and return to clinic in 2-3 months for IOP check/ VFT , also seen by Dr Iron Dias - will return if needed ; ((TARGET ~< 14 OU for now )) TESTS:  Reviewed 02/07/22 OCT - OD: Good-73 (23) - inferior thinning; OS: Good-62 (70) -  fluctuates, inf thinning ( first unrel) 07/12/21 Visual Field - OD: Poor-sup depression , inferior scatter; OS: Poor-dense general depression, poor va Discussed glaucoma diagnosis in detail with patient. Emphasized and explained compliance.  Poor compliance can lead to blindness, given glaucoma booklet 5/31/19

## 2022-02-07 NOTE — IMPRESSION/PLAN
Impression: Central corneal opacity of left eye Plan: Monitored in general clinic.; Discussed monocular precautions with patient. Advised pt to wear protective eyewear.

## 2022-05-16 ENCOUNTER — OFFICE VISIT (OUTPATIENT)
Dept: URBAN - METROPOLITAN AREA CLINIC 44 | Facility: CLINIC | Age: 87
End: 2022-05-16
Payer: MEDICARE

## 2022-05-16 DIAGNOSIS — H17.12 CENTRAL CORNEAL OPACITY, LEFT EYE: ICD-10-CM

## 2022-05-16 DIAGNOSIS — H18.423 BAND KERATOPATHY, BILATERAL: ICD-10-CM

## 2022-05-16 DIAGNOSIS — H40.1133 PRIMARY OPEN-ANGLE GLAUCOMA, BILATERAL, SEVERE STAGE: Primary | ICD-10-CM

## 2022-05-16 DIAGNOSIS — H20.022 RECURRENT ACUTE IRIDOCYCLITIS, LEFT EYE: ICD-10-CM

## 2022-05-16 PROCEDURE — 92083 EXTENDED VISUAL FIELD XM: CPT | Performed by: OPHTHALMOLOGY

## 2022-05-16 PROCEDURE — 99214 OFFICE O/P EST MOD 30 MIN: CPT | Performed by: OPHTHALMOLOGY

## 2022-05-16 ASSESSMENT — INTRAOCULAR PRESSURE
OS: 16
OD: 15

## 2022-05-16 NOTE — IMPRESSION/PLAN
Impression: Primary open-angle glaucoma, severe stage, bilateral
Positive:  Fam Hx of Glaucoma- sister; Heart problems - open heart surgery - heart attack Denies: Sulfa Allergy -  Lung Problems - Sleep Apnea - Hx of Migraines . .. no fdts OFF Latanoprost OU QHS (hx of iritis) Plan: PLAN: On Alphagan BID OU (should be TID OU), Dorzolamide BID OU, On Rhopressa QHS OU, OFF Pred QD OS, VFT may be more dense but fluctuates ou but IOP is higher ou,  but given inflammation history - will remain off latanoprost  , rec lower iop, so change dorzol and brim to tid ou, cont rhop qhs ou and rtc 1 mon for iop recheck   , also seen by Dr Fauzia Cr - will return if needed ; ((TARGET ~< 14 OU for now ))  given gtt sheet 5/16/22 TESTS:  Reviewed 05/16/22 Visual Field - OD: Poor-sup depression , inferior scatter; OS: Poor-dense general depression, poor va
02/07/22 OCT - OD: Good-73 (23) - inferior thinning; OS: Good-62 (70) -  fluctuates, inf thinning ( first unrel) 07/12/21 Visual Field - OD: Poor-sup depression , inferior scatter; OS: Poor-dense general depression, poor va Discussed glaucoma diagnosis in detail with patient. Emphasized and explained compliance.  Poor compliance can lead to blindness, given glaucoma booklet 5/31/19

## 2022-06-24 ENCOUNTER — OFFICE VISIT (OUTPATIENT)
Dept: URBAN - METROPOLITAN AREA CLINIC 44 | Facility: CLINIC | Age: 87
End: 2022-06-24
Payer: MEDICARE

## 2022-06-24 DIAGNOSIS — H18.423 BAND KERATOPATHY, BILATERAL: ICD-10-CM

## 2022-06-24 DIAGNOSIS — H40.1133 PRIMARY OPEN-ANGLE GLAUCOMA, BILATERAL, SEVERE STAGE: Primary | ICD-10-CM

## 2022-06-24 DIAGNOSIS — H04.123 DRY EYE SYNDROME OF BILATERAL LACRIMAL GLANDS: ICD-10-CM

## 2022-06-24 DIAGNOSIS — H20.022 RECURRENT ACUTE IRIDOCYCLITIS, LEFT EYE: ICD-10-CM

## 2022-06-24 DIAGNOSIS — H17.12 CENTRAL CORNEAL OPACITY, LEFT EYE: ICD-10-CM

## 2022-06-24 PROCEDURE — 99213 OFFICE O/P EST LOW 20 MIN: CPT | Performed by: OPHTHALMOLOGY

## 2022-06-24 RX ORDER — DORZOLAMIDE HCL 20 MG/ML
2 % SOLUTION/ DROPS OPHTHALMIC
Qty: 10 | Refills: 3 | Status: ACTIVE
Start: 2022-06-24

## 2022-06-24 ASSESSMENT — INTRAOCULAR PRESSURE
OD: 13
OS: 13

## 2022-06-24 NOTE — IMPRESSION/PLAN
Impression: Dense Band keratopathy, bilateral
- s/p EDTA chelation OU Plan: s/p edta ou, use art tears

## 2022-06-24 NOTE — IMPRESSION/PLAN
Impression: Primary open-angle glaucoma, severe stage, bilateral
Positive:  Fam Hx of Glaucoma- sister; Heart problems - open heart surgery - heart attack Denies: Sulfa Allergy -  Lung Problems - Sleep Apnea - Hx of Migraines . .. no fdts OFF Latanoprost OU QHS (hx of iritis) Plan: PLAN: On Alphagan TID OU (from BID OU), Dorzolamide TID OU(from bid), On Rhopressa QHS OU, OFF Pred QD OS ; IOP is improving ou,  but given inflammation history - will remain off latanoprost  ; will continue dorzol and brim tid ou, and rhop qhs ou and rtc in 2-3 mon for iop recheck  ,  also seen by Dr Scott Chele - will return if needed ; ((TARGET ~< 14 OU for now )) TESTS:  Reviewed 05/16/22 Visual Field - OD: Poor-sup depression , inferior scatter; OS: Poor-dense general depression, poor va
02/07/22 OCT - OD: Good-73 (23) - inferior thinning; OS: Good-62 (70) -  fluctuates, inf thinning ( first unrel) 07/12/21 Visual Field - OD: Poor-sup depression , inferior scatter; OS: Poor-dense general depression, poor va Discussed glaucoma diagnosis in detail with patient. Emphasized and explained compliance.  Poor compliance can lead to blindness, given glaucoma booklet 5/31/19

## 2022-10-31 ENCOUNTER — OFFICE VISIT (OUTPATIENT)
Dept: URBAN - METROPOLITAN AREA CLINIC 44 | Facility: CLINIC | Age: 87
End: 2022-10-31
Payer: MEDICARE

## 2022-10-31 DIAGNOSIS — H18.423 BAND KERATOPATHY, BILATERAL: ICD-10-CM

## 2022-10-31 DIAGNOSIS — H17.12 CENTRAL CORNEAL OPACITY, LEFT EYE: ICD-10-CM

## 2022-10-31 DIAGNOSIS — H40.1133 PRIMARY OPEN-ANGLE GLAUCOMA, BILATERAL, SEVERE STAGE: Primary | ICD-10-CM

## 2022-10-31 DIAGNOSIS — H20.022 RECURRENT ACUTE IRIDOCYCLITIS, LEFT EYE: ICD-10-CM

## 2022-10-31 DIAGNOSIS — H04.123 DRY EYE SYNDROME OF BILATERAL LACRIMAL GLANDS: ICD-10-CM

## 2022-10-31 PROCEDURE — 99213 OFFICE O/P EST LOW 20 MIN: CPT | Performed by: OPHTHALMOLOGY

## 2022-10-31 PROCEDURE — 92133 CPTRZD OPH DX IMG PST SGM ON: CPT | Performed by: OPHTHALMOLOGY

## 2022-10-31 RX ORDER — DORZOLAMIDE HCL 20 MG/ML
2 % SOLUTION/ DROPS OPHTHALMIC
Qty: 30 | Refills: 1 | Status: ACTIVE
Start: 2022-10-31

## 2022-10-31 RX ORDER — BRIMONIDINE TARTRATE 1 MG/ML
0.1 % SOLUTION/ DROPS OPHTHALMIC
Qty: 15 | Refills: 2 | Status: ACTIVE
Start: 2022-10-31

## 2022-10-31 ASSESSMENT — INTRAOCULAR PRESSURE
OD: 14
OS: 14

## 2022-10-31 NOTE — IMPRESSION/PLAN
Impression: Primary open-angle glaucoma, severe stage, bilateral
Positive:  Fam Hx of Glaucoma- sister; Heart problems - open heart surgery - heart attack Denies: Sulfa Allergy -  Lung Problems - Sleep Apnea - Hx of Migraines . .. no fdts OFF Latanoprost OU QHS (hx of iritis) Plan: PLAN: On Alphagan TID OU (from BID OU), Dorzolamide BID OU (should be TID), On Rhopressa QHS OU, OFF Pred QD OS ; IOP is improving ou,  but given inflammation history - will remain off latanoprost  ; will continue dorzol and brim AT tid ou, and rhop qhs ou and rtc in 2-3 mon for iop check  ,  prev seen by Dr Kelvin Delaney ;    ((TARGET ~< 14 OU for now )) TESTS:  Reviewed 10/31/22 OCT - OD: Fair-69 (73, 23) - inferior thinning , fluctuates; OS: unable OS- (62, 70) -  fluctuates, inf thinning 05/16/22 Visual Field - OD: Poor-sup depression , inferior scatter; OS: Poor-dense general depression, poor va Discussed glaucoma diagnosis in detail with patient. Emphasized and explained compliance.  Poor compliance can lead to blindness, given glaucoma booklet 5/31/19

## 2023-02-03 ENCOUNTER — OFFICE VISIT (OUTPATIENT)
Dept: URBAN - METROPOLITAN AREA CLINIC 44 | Facility: CLINIC | Age: 88
End: 2023-02-03
Payer: MEDICARE

## 2023-02-03 DIAGNOSIS — H20.022 RECURRENT ACUTE IRIDOCYCLITIS, LEFT EYE: ICD-10-CM

## 2023-02-03 DIAGNOSIS — H04.123 DRY EYE SYNDROME OF BILATERAL LACRIMAL GLANDS: ICD-10-CM

## 2023-02-03 DIAGNOSIS — H17.12 CENTRAL CORNEAL OPACITY, LEFT EYE: ICD-10-CM

## 2023-02-03 DIAGNOSIS — H40.1133 PRIMARY OPEN-ANGLE GLAUCOMA, BILATERAL, SEVERE STAGE: Primary | ICD-10-CM

## 2023-02-03 DIAGNOSIS — H18.423 BAND KERATOPATHY, BILATERAL: ICD-10-CM

## 2023-02-03 PROCEDURE — 99213 OFFICE O/P EST LOW 20 MIN: CPT | Performed by: OPHTHALMOLOGY

## 2023-02-03 RX ORDER — NETARSUDIL 0.2 MG/ML
0.02 % SOLUTION/ DROPS OPHTHALMIC; TOPICAL
Qty: 7.5 | Refills: 2 | Status: ACTIVE
Start: 2023-02-03

## 2023-02-03 RX ORDER — DORZOLAMIDE HCL 20 MG/ML
2 % SOLUTION/ DROPS OPHTHALMIC
Qty: 30 | Refills: 1 | Status: ACTIVE
Start: 2023-02-03

## 2023-02-03 RX ORDER — BRIMONIDINE TARTRATE 1 MG/ML
0.1 % SOLUTION/ DROPS OPHTHALMIC
Qty: 15 | Refills: 2 | Status: ACTIVE
Start: 2023-02-03

## 2023-02-03 ASSESSMENT — INTRAOCULAR PRESSURE
OS: 13
OD: 12

## 2023-05-01 ENCOUNTER — OFFICE VISIT (OUTPATIENT)
Dept: URBAN - METROPOLITAN AREA CLINIC 44 | Facility: CLINIC | Age: 88
End: 2023-05-01
Payer: MEDICARE

## 2023-05-01 DIAGNOSIS — H40.1133 PRIMARY OPEN-ANGLE GLAUCOMA, BILATERAL, SEVERE STAGE: Primary | ICD-10-CM

## 2023-05-01 DIAGNOSIS — H04.123 DRY EYE SYNDROME OF BILATERAL LACRIMAL GLANDS: ICD-10-CM

## 2023-05-01 DIAGNOSIS — H20.022 RECURRENT ACUTE IRIDOCYCLITIS, LEFT EYE: ICD-10-CM

## 2023-05-01 DIAGNOSIS — H18.423 BAND KERATOPATHY, BILATERAL: ICD-10-CM

## 2023-05-01 DIAGNOSIS — H17.12 CENTRAL CORNEAL OPACITY, LEFT EYE: ICD-10-CM

## 2023-05-01 PROCEDURE — 99213 OFFICE O/P EST LOW 20 MIN: CPT | Performed by: OPHTHALMOLOGY

## 2023-05-01 PROCEDURE — 92083 EXTENDED VISUAL FIELD XM: CPT | Performed by: OPHTHALMOLOGY

## 2023-05-01 RX ORDER — BRIMONIDINE TARTRATE 1 MG/ML
0.1 % SOLUTION/ DROPS OPHTHALMIC
Qty: 15 | Refills: 3 | Status: ACTIVE
Start: 2023-05-01

## 2023-05-01 RX ORDER — BRINZOLAMIDE/BRIMONIDINE TARTRATE 10; 2 MG/ML; MG/ML
SUSPENSION/ DROPS OPHTHALMIC
Qty: 10 | Refills: 1 | Status: INACTIVE
Start: 2023-05-01 | End: 2023-07-29

## 2023-05-01 ASSESSMENT — INTRAOCULAR PRESSURE
OD: 13
OS: 12

## 2023-05-01 NOTE — IMPRESSION/PLAN
Impression: Primary open-angle glaucoma, severe stage, bilateral
Positive:  Fam Hx of Glaucoma- sister; Heart problems - open heart surgery - heart attack Denies: Sulfa Allergy -  Lung Problems - Sleep Apnea - Hx of Migraines . .. no fdts OFF Latanoprost OU QHS (hx of iritis) Plan: PLAN: On Alphagan TID OU , Dorzolamide BID OU,  On Rhopressa QHS OU, OFF Pred QD OS ; VFT is  similar  ou (ltf's)  and  IOP is holding ou,  but given inflammation history - will remain off latanoprost  ; will continue meds ou - may replace alphagan and dorzol with simbrinza tid ou; and rtc in 2-3 mon for iop check   ,  prev seen by Dr Katrina Mccann ;    ((TARGET ~< 14 OU for now ))  gtt sheet 5/1/23 TESTS:  Reviewed 5/1/23 Visual Field - OD: Poor-sup depression , inferior scatter; OS: Poor-dense general depression, poor va
10/31/22 OCT - OD: Fair-69 (73, 23) - inferior thinning , fluctuates; OS: unable OS- (62, 70) -  fluctuates, inf thinning 05/16/22 Visual Field - OD: Poor-sup depression , inferior scatter; OS: Poor-dense general depression, poor va Discussed glaucoma diagnosis in detail with patient. Emphasized and explained compliance.  Poor compliance can lead to blindness, given glaucoma booklet 5/31/19

## 2023-08-07 ENCOUNTER — OFFICE VISIT (OUTPATIENT)
Dept: URBAN - METROPOLITAN AREA CLINIC 44 | Facility: CLINIC | Age: 88
End: 2023-08-07
Payer: MEDICARE

## 2023-08-07 DIAGNOSIS — H04.123 DRY EYE SYNDROME OF BILATERAL LACRIMAL GLANDS: ICD-10-CM

## 2023-08-07 DIAGNOSIS — H17.12 CENTRAL CORNEAL OPACITY, LEFT EYE: ICD-10-CM

## 2023-08-07 DIAGNOSIS — H18.423 BAND KERATOPATHY, BILATERAL: ICD-10-CM

## 2023-08-07 DIAGNOSIS — H20.022 RECURRENT ACUTE IRIDOCYCLITIS, LEFT EYE: ICD-10-CM

## 2023-08-07 DIAGNOSIS — H40.1133 PRIMARY OPEN-ANGLE GLAUCOMA, BILATERAL, SEVERE STAGE: Primary | ICD-10-CM

## 2023-08-07 PROCEDURE — 99213 OFFICE O/P EST LOW 20 MIN: CPT | Performed by: OPHTHALMOLOGY

## 2023-08-07 RX ORDER — NETARSUDIL 0.2 MG/ML
0.02 % SOLUTION/ DROPS OPHTHALMIC; TOPICAL
Qty: 7.5 | Refills: 2 | Status: ACTIVE
Start: 2023-08-07

## 2023-08-07 RX ORDER — DORZOLAMIDE HCL 20 MG/ML
2 % SOLUTION/ DROPS OPHTHALMIC
Qty: 30 | Refills: 1 | Status: ACTIVE
Start: 2023-08-07

## 2023-08-07 RX ORDER — BRIMONIDINE TARTRATE 1 MG/ML
0.1 % SOLUTION/ DROPS OPHTHALMIC
Qty: 15 | Refills: 3 | Status: ACTIVE
Start: 2023-08-07

## 2023-08-07 ASSESSMENT — INTRAOCULAR PRESSURE
OS: 13
OD: 13

## 2024-01-12 ENCOUNTER — OFFICE VISIT (OUTPATIENT)
Dept: URBAN - METROPOLITAN AREA CLINIC 44 | Facility: CLINIC | Age: 89
End: 2024-01-12
Payer: MEDICARE

## 2024-01-12 DIAGNOSIS — H04.123 DRY EYE SYNDROME OF BILATERAL LACRIMAL GLANDS: ICD-10-CM

## 2024-01-12 DIAGNOSIS — H40.1133 PRIMARY OPEN-ANGLE GLAUCOMA, BILATERAL, SEVERE STAGE: Primary | ICD-10-CM

## 2024-01-12 DIAGNOSIS — H20.022 RECURRENT ACUTE IRIDOCYCLITIS, LEFT EYE: ICD-10-CM

## 2024-01-12 DIAGNOSIS — H18.423 BAND KERATOPATHY, BILATERAL: ICD-10-CM

## 2024-01-12 DIAGNOSIS — H17.12 CENTRAL CORNEAL OPACITY, LEFT EYE: ICD-10-CM

## 2024-01-12 PROCEDURE — 99213 OFFICE O/P EST LOW 20 MIN: CPT | Performed by: OPHTHALMOLOGY

## 2024-01-12 ASSESSMENT — INTRAOCULAR PRESSURE
OD: 13
OS: 15

## 2024-04-29 ENCOUNTER — OFFICE VISIT (OUTPATIENT)
Dept: URBAN - METROPOLITAN AREA CLINIC 44 | Facility: CLINIC | Age: 89
End: 2024-04-29
Payer: MEDICARE

## 2024-04-29 DIAGNOSIS — H04.123 DRY EYE SYNDROME OF BILATERAL LACRIMAL GLANDS: ICD-10-CM

## 2024-04-29 DIAGNOSIS — H20.022 RECURRENT ACUTE IRIDOCYCLITIS, LEFT EYE: ICD-10-CM

## 2024-04-29 DIAGNOSIS — H18.423 BAND KERATOPATHY, BILATERAL: ICD-10-CM

## 2024-04-29 DIAGNOSIS — H40.1133 PRIMARY OPEN-ANGLE GLAUCOMA, BILATERAL, SEVERE STAGE: Primary | ICD-10-CM

## 2024-04-29 DIAGNOSIS — H17.12 CENTRAL CORNEAL OPACITY, LEFT EYE: ICD-10-CM

## 2024-04-29 DIAGNOSIS — E11.9 DIABETES MELLITUS TYPE 2 WITHOUT MENTION OF COMPLICATION: ICD-10-CM

## 2024-04-29 PROCEDURE — 99213 OFFICE O/P EST LOW 20 MIN: CPT | Performed by: OPHTHALMOLOGY

## 2024-04-29 PROCEDURE — 92133 CPTRZD OPH DX IMG PST SGM ON: CPT | Performed by: OPHTHALMOLOGY

## 2024-04-29 RX ORDER — NETARSUDIL 0.2 MG/ML
0.02 % SOLUTION/ DROPS OPHTHALMIC; TOPICAL
Qty: 7.5 | Refills: 1 | Status: ACTIVE
Start: 2024-04-29

## 2024-04-29 ASSESSMENT — INTRAOCULAR PRESSURE
OD: 12
OS: 14

## 2024-08-05 ENCOUNTER — TECH ONLY (OUTPATIENT)
Dept: URBAN - METROPOLITAN AREA CLINIC 44 | Facility: CLINIC | Age: 89
End: 2024-08-05
Payer: MEDICARE

## 2024-08-05 DIAGNOSIS — H40.1133 PRIMARY OPEN-ANGLE GLAUCOMA, BILATERAL, SEVERE STAGE: Primary | ICD-10-CM

## 2024-08-05 ASSESSMENT — INTRAOCULAR PRESSURE
OD: 13
OS: 15

## 2024-08-21 NOTE — IMPRESSION/PLAN
Impression: Primary open-angle glaucoma, severe stage, bilateral
Positive:  Fam Hx of Glaucoma- sister; Heart problems - open heart surgery - heart attack Denies: Sulfa Allergy -  Lung Problems - Sleep Apnea - Hx of Migraines . .. no fdts OFF Latanoprost OU QHS (hx of iritis) Plan: PLAN: On Alphagan TID OU , Dorzolamide BID OU,  On Rhopressa QHS OU, OFF Pred QD OS ; IOP is stable ou,  but given inflammation history - will remain off latanoprost  ; will continue meds ou and rtc in 2-3 mon for iop check/VFT   ,  prev seen by Dr Rima Blackwell ;    ((TARGET ~< 14 OU for now )) TESTS:  Reviewed 10/31/22 OCT - OD: Fair-69 (73, 23) - inferior thinning , fluctuates; OS: unable OS- (62, 70) -  fluctuates, inf thinning 05/16/22 Visual Field - OD: Poor-sup depression , inferior scatter; OS: Poor-dense general depression, poor va Discussed glaucoma diagnosis in detail with patient. Emphasized and explained compliance.  Poor compliance can lead to blindness, given glaucoma booklet 5/31/19 Detail Level: Detailed

## 2025-02-06 ENCOUNTER — OFFICE VISIT (OUTPATIENT)
Dept: URBAN - METROPOLITAN AREA CLINIC 56 | Facility: LOCATION | Age: OVER 89
End: 2025-02-06
Payer: MEDICARE

## 2025-02-06 DIAGNOSIS — H04.123 DRY EYE SYNDROME OF BILATERAL LACRIMAL GLANDS: ICD-10-CM

## 2025-02-06 DIAGNOSIS — H40.1133 PRIMARY OPEN-ANGLE GLAUCOMA, BILATERAL, SEVERE STAGE: Primary | ICD-10-CM

## 2025-02-06 PROCEDURE — 92133 CPTRZD OPH DX IMG PST SGM ON: CPT | Performed by: STUDENT IN AN ORGANIZED HEALTH CARE EDUCATION/TRAINING PROGRAM

## 2025-02-06 PROCEDURE — 99214 OFFICE O/P EST MOD 30 MIN: CPT | Performed by: STUDENT IN AN ORGANIZED HEALTH CARE EDUCATION/TRAINING PROGRAM

## 2025-02-06 PROCEDURE — 92020 GONIOSCOPY: CPT | Performed by: STUDENT IN AN ORGANIZED HEALTH CARE EDUCATION/TRAINING PROGRAM

## 2025-02-06 PROCEDURE — 92083 EXTENDED VISUAL FIELD XM: CPT | Performed by: STUDENT IN AN ORGANIZED HEALTH CARE EDUCATION/TRAINING PROGRAM

## 2025-02-06 RX ORDER — PREDNISOLONE ACETATE 10 MG/ML
1 % SUSPENSION/ DROPS OPHTHALMIC
Qty: 10 | Refills: 1 | Status: ACTIVE
Start: 2025-02-06

## 2025-02-06 RX ORDER — NETARSUDIL 0.2 MG/ML
0.02 % SOLUTION/ DROPS OPHTHALMIC; TOPICAL
Qty: 7.5 | Refills: 1 | Status: ACTIVE
Start: 2025-02-06

## 2025-02-06 ASSESSMENT — INTRAOCULAR PRESSURE
OD: 11
OS: 14

## 2025-03-04 ENCOUNTER — SURGERY (OUTPATIENT)
Dept: URBAN - METROPOLITAN AREA SURGERY 28 | Facility: LOCATION | Age: OVER 89
End: 2025-03-04
Payer: MEDICARE

## 2025-04-03 ENCOUNTER — POST-OPERATIVE VISIT (OUTPATIENT)
Dept: URBAN - METROPOLITAN AREA CLINIC 56 | Facility: LOCATION | Age: OVER 89
End: 2025-04-03
Payer: MEDICARE

## 2025-04-03 DIAGNOSIS — Z48.810 ENCOUNTER FOR SURGICAL AFTERCARE FOLLOWING SURGERY ON A SENSE ORGAN: Primary | ICD-10-CM

## 2025-04-03 PROCEDURE — 99024 POSTOP FOLLOW-UP VISIT: CPT | Performed by: STUDENT IN AN ORGANIZED HEALTH CARE EDUCATION/TRAINING PROGRAM

## 2025-04-03 ASSESSMENT — INTRAOCULAR PRESSURE
OD: 11
OS: 16

## 2025-07-03 ENCOUNTER — OFFICE VISIT (OUTPATIENT)
Dept: URBAN - METROPOLITAN AREA CLINIC 56 | Facility: LOCATION | Age: OVER 89
End: 2025-07-03
Payer: MEDICARE

## 2025-07-03 DIAGNOSIS — H04.123 DRY EYE SYNDROME OF BILATERAL LACRIMAL GLANDS: ICD-10-CM

## 2025-07-03 DIAGNOSIS — H40.1133 PRIMARY OPEN-ANGLE GLAUCOMA, BILATERAL, SEVERE STAGE: Primary | ICD-10-CM

## 2025-07-03 PROCEDURE — 76514 ECHO EXAM OF EYE THICKNESS: CPT | Performed by: STUDENT IN AN ORGANIZED HEALTH CARE EDUCATION/TRAINING PROGRAM

## 2025-07-03 PROCEDURE — 92133 CPTRZD OPH DX IMG PST SGM ON: CPT | Performed by: STUDENT IN AN ORGANIZED HEALTH CARE EDUCATION/TRAINING PROGRAM

## 2025-07-03 PROCEDURE — 99213 OFFICE O/P EST LOW 20 MIN: CPT | Performed by: STUDENT IN AN ORGANIZED HEALTH CARE EDUCATION/TRAINING PROGRAM

## 2025-07-03 RX ORDER — BRIMONIDINE TARTRATE 1 MG/ML
0.1 % SOLUTION/ DROPS OPHTHALMIC
Qty: 15 | Refills: 3 | Status: ACTIVE
Start: 2025-07-03

## 2025-07-03 ASSESSMENT — KERATOMETRY: OD: 38.88

## 2025-07-03 ASSESSMENT — INTRAOCULAR PRESSURE
OS: 16
OD: 15